# Patient Record
(demographics unavailable — no encounter records)

---

## 2025-01-23 NOTE — HISTORY OF PRESENT ILLNESS
[FreeTextEntry1] : MONICA is a 58 year old female, presents for breast cancer screening follow up. Patient has a h/o bilateral breast cysts s/p bilateral aspirations (reportedly benign) approximately 5 years ago. Denies history of breast surgery. Denies palpable abnormalities, no skin changes/dimpling, no nipple discharge bilaterally.  Patient underwent a bilateral screening mammogram on 1/9/2025 which returned benign BI-RADS 2.  IVAN lifetime risk of 8.1%. Patient has a family history of breast cancer, maternal aunt diagnosed age 56, maternal cousin (unaffected maternal aunts daughter), unknown age but diagnosed >51yo. Unsure if genetic testing was completed (family lives in [facility]).

## 2025-01-23 NOTE — ASSESSMENT
[FreeTextEntry1] : 58 year-old female, here for breast cancer screening   #Breast cancer screening Reviewed clinical breast exam findings with the patient which were within normal limits.  Reviewed the patient's bilateral screening mammogram completed January 2025 which returned benign BI-RADS 2.   Reviewed given no mammogram density is scattered breast density, mammographic screening is sufficient moving forward, no indication for screening ultrasound at this time. The patient will proceed with a bilateral screening mammogram January 2026 and follow-up at that time.

## 2025-01-23 NOTE — PAST MEDICAL HISTORY
[Menarche Age ____] : age at menarche was [unfilled] [Menopause Age____] : age at menopause was [unfilled] [unknown] : the patient is unsure of the date of her LMP [Total Preg ___] : G[unfilled] [Live Births ___] : P[unfilled]  [Living ___] : Living: [unfilled] [Age At Live Birth ___] : Age at live birth: [unfilled] [History of Hormone Replacement Treatment] : has no history of hormone replacement treatment [FreeTextEntry6] : None [FreeTextEntry7] : No

## 2025-01-23 NOTE — HISTORY OF PRESENT ILLNESS
[FreeTextEntry1] : MONICA is a 58 year old female, presents for breast cancer screening follow up. Patient has a h/o bilateral breast cysts s/p bilateral aspirations (reportedly benign) approximately 5 years ago. Denies history of breast surgery. Denies palpable abnormalities, no skin changes/dimpling, no nipple discharge bilaterally.  Patient underwent a bilateral screening mammogram on 1/9/2025 which returned benign BI-RADS 2.  IVAN lifetime risk of 8.1%. Patient has a family history of breast cancer, maternal aunt diagnosed age 56, maternal cousin (unaffected maternal aunts daughter), unknown age but diagnosed >49yo. Unsure if genetic testing was completed (family lives in [facility]).

## 2025-01-23 NOTE — DATA REVIEWED
[FreeTextEntry1] : 1/7/21 ([facility]) B/L mammogram: heterogeneously dense. No mammographic evidence of malignancy. Negative BIRADS 1  12/16/22 ([facility]) B/L mammogram: heterogeneously dense, right upper outer quadrant, 9cmFN focal asymmetry, indeterminate. Spot compression views and ultrasound are recommended. BIRADS 0  1/31/23 ([facility]) Right dx mammogram and b/l US: scattered areas of fibroglandular density. No suspicious mass, suspicious microcalcifications, or other sign of malignancy is identified. Previously noted area of asymmetry compresses out. The breast parenchyma demonstrates a heterogeneous background echotexture (mixed fatty and fibroglandular). No suspicious solid mass. Scattered small cysts. IMPRESSION: No mammographic or sonographic evidence of malignancy. BI-RADS 2 - Benign Finding.  1/8/2024 ([facility]) bilateral screening mammogram/US: Scattered areas of fibroglandular density bilateral scattered small cysts, no mammographic or sonographic evidence of malignancy. Mammography in 1 year. Benign BI-RADS 2.  1/9/25 ([facility]) b/l screening mammogram:  scattered areas of fibroglandular density, There are scattered mammographically benign calcifications demonstrable bilaterally. Identified is slight asymmetric residual glandular tissue in the upper-outer quadrant of the right breast as compared to the left. No suspicious mass, suspicious microcalcifications, or other sign of malignancy is identified. No mammographic evidence of malignancy. RECOMMENDATION:  Mammography in 1 year. BI-RADS 2 - Benign Finding

## 2025-01-23 NOTE — DATA REVIEWED
[FreeTextEntry1] : 1/7/21 ([facility]) B/L mammogram: heterogeneously dense. No mammographic evidence of malignancy. Negative BIRADS 1  12/16/22 ([facility]) B/L mammogram: heterogeneously dense, right upper outer quadrant, 9cmFN focal asymmetry, indeterminate. Spot compression views and ultrasound are recommended. BIRADS 0  1/31/23 ([facility]) Right dx mammogram and b/l US: scattered areas of fibroglandular density. No suspicious mass, suspicious microcalcifications, or other sign of malignancy is identified. Previously noted area of asymmetry compresses out. The breast parenchyma demonstrates a heterogeneous background echotexture (mixed fatty and fibroglandular). No suspicious solid mass. Scattered small cysts. IMPRESSION: No mammographic or sonographic evidence of malignancy. BI-RADS 2 - Benign Finding.  1/8/2024 ([facility]) bilateral screening mammogram/US: Scattered areas of fibroglandular density bilateral scattered small cysts, no mammographic or sonographic evidence of malignancy. Mammography in 1 year. Benign BI-RADS 2.  1/9/25 ([facility]) b/l screening mammogram:  scattered areas of fibroglandular density, There are scattered mammographically benign calcifications demonstrable bilaterally. Identified is slight asymmetric residual glandular tissue in the upper-outer quadrant of the right breast as compared to the left. No suspicious mass, suspicious microcalcifications, or other sign of malignancy is identified. No mammographic evidence of malignancy. RECOMMENDATION:  Mammography in 1 year. BI-RADS 2 - Benign Finding

## 2025-01-23 NOTE — PHYSICAL EXAM
[Normocephalic] : normocephalic [Supple] : supple [No Supraclavicular Adenopathy] : no supraclavicular adenopathy [Examined in the supine and seated position] : examined in the supine and seated position [No dominant masses] : no dominant masses in right breast  [No dominant masses] : no dominant masses left breast [No Nipple Retraction] : no left nipple retraction [No Nipple Discharge] : no left nipple discharge [No Axillary Lymphadenopathy] : no left axillary lymphadenopathy